# Patient Record
Sex: FEMALE | Race: OTHER | HISPANIC OR LATINO | URBAN - METROPOLITAN AREA
[De-identification: names, ages, dates, MRNs, and addresses within clinical notes are randomized per-mention and may not be internally consistent; named-entity substitution may affect disease eponyms.]

---

## 2022-07-09 ENCOUNTER — TELEPHONE ENCOUNTER (OUTPATIENT)
Dept: URBAN - METROPOLITAN AREA CLINIC 121 | Facility: CLINIC | Age: 77
End: 2022-07-09

## 2022-07-09 RX ORDER — ATORVASTATIN CALCIUM 20 MG/1
TABLET, FILM COATED ORAL
Refills: 0 | OUTPATIENT
Start: 2018-12-31 | End: 2019-07-22

## 2022-07-09 RX ORDER — ASPIRIN 81 MG/1
TABLET, FILM COATED ORAL
Refills: 0 | OUTPATIENT
Start: 2018-11-19 | End: 2018-12-31

## 2022-07-09 RX ORDER — ATORVASTATIN CALCIUM 20 MG/1
TABLET, FILM COATED ORAL
Refills: 0 | OUTPATIENT
Start: 2018-11-19 | End: 2018-12-31

## 2022-07-09 RX ORDER — ASPIRIN 81 MG/1
TABLET, FILM COATED ORAL
Refills: 0 | OUTPATIENT
Start: 2018-12-31 | End: 2019-07-22

## 2022-07-09 RX ORDER — AMLODIPINE BESYLATE 10 MG/1
TABLET ORAL
Refills: 0 | OUTPATIENT
Start: 2018-12-31 | End: 2019-07-22

## 2022-07-09 RX ORDER — AMLODIPINE BESYLATE 10 MG/1
TABLET ORAL
Refills: 0 | OUTPATIENT
Start: 2018-11-19 | End: 2018-12-31

## 2022-07-10 ENCOUNTER — TELEPHONE ENCOUNTER (OUTPATIENT)
Dept: URBAN - METROPOLITAN AREA CLINIC 121 | Facility: CLINIC | Age: 77
End: 2022-07-10

## 2022-07-10 RX ORDER — NAPROXEN SODIUM 220 MG
TAKE ONE CAPSULE BY MOUTH ONCE A DAY TABLET ORAL ONCE A DAY
Refills: 1 | Status: ACTIVE | COMMUNITY
Start: 2018-12-31

## 2022-07-10 RX ORDER — ATORVASTATIN CALCIUM 20 MG/1
TABLET, FILM COATED ORAL
Refills: 0 | Status: ACTIVE | COMMUNITY
Start: 2019-07-22

## 2022-07-10 RX ORDER — AMLODIPINE BESYLATE 10 MG/1
TABLET ORAL
Refills: 0 | Status: ACTIVE | COMMUNITY
Start: 2019-07-22

## 2022-07-10 RX ORDER — ASPIRIN 81 MG/1
TABLET, FILM COATED ORAL
Refills: 0 | Status: ACTIVE | COMMUNITY
Start: 2019-07-22

## 2022-08-01 ENCOUNTER — OFFICE VISIT (OUTPATIENT)
Dept: URBAN - METROPOLITAN AREA CLINIC 60 | Facility: CLINIC | Age: 77
End: 2022-08-01

## 2022-09-12 ENCOUNTER — OFFICE VISIT (OUTPATIENT)
Dept: URBAN - METROPOLITAN AREA CLINIC 60 | Facility: CLINIC | Age: 77
End: 2022-09-12

## 2022-10-03 ENCOUNTER — OFFICE VISIT (OUTPATIENT)
Dept: URBAN - METROPOLITAN AREA CLINIC 60 | Facility: CLINIC | Age: 77
End: 2022-10-03

## 2022-10-17 ENCOUNTER — WEB ENCOUNTER (OUTPATIENT)
Dept: URBAN - METROPOLITAN AREA CLINIC 60 | Facility: CLINIC | Age: 77
End: 2022-10-17

## 2022-10-17 ENCOUNTER — LAB OUTSIDE AN ENCOUNTER (OUTPATIENT)
Dept: URBAN - METROPOLITAN AREA CLINIC 60 | Facility: CLINIC | Age: 77
End: 2022-10-17

## 2022-10-17 ENCOUNTER — OFFICE VISIT (OUTPATIENT)
Dept: URBAN - METROPOLITAN AREA CLINIC 60 | Facility: CLINIC | Age: 77
End: 2022-10-17
Payer: COMMERCIAL

## 2022-10-17 VITALS
SYSTOLIC BLOOD PRESSURE: 128 MMHG | WEIGHT: 152.4 LBS | BODY MASS INDEX: 27 KG/M2 | TEMPERATURE: 98.7 F | HEIGHT: 63 IN | OXYGEN SATURATION: 98 % | DIASTOLIC BLOOD PRESSURE: 70 MMHG | HEART RATE: 78 BPM

## 2022-10-17 DIAGNOSIS — Z86.010 PERSONAL HISTORY OF COLONIC POLYPS: ICD-10-CM

## 2022-10-17 DIAGNOSIS — Z12.11 COLON CANCER SCREENING: ICD-10-CM

## 2022-10-17 DIAGNOSIS — K59.00 CONSTIPATION, UNSPECIFIED: ICD-10-CM

## 2022-10-17 DIAGNOSIS — R79.89 ABNORMAL LFTS: ICD-10-CM

## 2022-10-17 PROCEDURE — 99204 OFFICE O/P NEW MOD 45 MIN: CPT | Performed by: PHYSICIAN ASSISTANT

## 2022-10-17 RX ORDER — ATORVASTATIN CALCIUM 40 MG/1
AS DIRECTED TABLET, FILM COATED ORAL ONCE A DAY
Refills: 0 | Status: ACTIVE | COMMUNITY
Start: 2019-07-22

## 2022-10-17 RX ORDER — POLYETHYLENE GLYCOL-3350, SODIUM CHLORIDE, POTASSIUM CHLORIDE AND SODIUM BICARBONATE 420; 11.2; 5.72; 1.48 G/438.4G; G/438.4G; G/438.4G; G/438.4G
AS DIRECTED POWDER, FOR SOLUTION ORAL
Qty: 420 MILLILITER | Refills: 0 | OUTPATIENT
Start: 2022-10-17 | End: 2022-10-18

## 2022-10-17 RX ORDER — NAPROXEN SODIUM 220 MG
TAKE ONE CAPSULE BY MOUTH ONCE A DAY TABLET ORAL ONCE A DAY
Refills: 1 | Status: ON HOLD | COMMUNITY
Start: 2018-12-31

## 2022-10-17 RX ORDER — ASPIRIN 81 MG/1
TABLET, FILM COATED ORAL
Refills: 0 | Status: ON HOLD | COMMUNITY
Start: 2019-07-22

## 2022-10-17 RX ORDER — LISINOPRIL AND HYDROCHLOROTHIAZIDE 20; 25 MG/1; MG/1
1 TABLET TABLET ORAL ONCE A DAY
Status: ACTIVE | COMMUNITY

## 2022-10-17 RX ORDER — AMLODIPINE BESYLATE 10 MG/1
5MG TABLET ORAL
Refills: 0 | Status: ACTIVE | COMMUNITY
Start: 2019-07-22

## 2022-10-17 RX ORDER — ONDANSETRON HYDROCHLORIDE 4 MG/1
1 TABLET TABLET, FILM COATED ORAL
Qty: 2 | Refills: 0 | OUTPATIENT
Start: 2022-10-17

## 2022-10-17 NOTE — HPI-TODAY'S VISIT:
Donna is a pleasant 76 year old female who presents today for follow up.   Previous colonoscopy in 12/2018 significant for multiple tubular adenomas and placed into a 3 year recall.   Labs dated 9/7/2022 showed a normal hemoglobin.  Normal platelets.  ALT 35, total bilirubin 1.3 but otherwise normal CMP.  Thyroid panel normal.  Notes 1-2 daily bowel movements without constipation or diarrhea. Noting efficacy with Miralax daily. Denies any nausea, vomiting, dysphagia, odynophagia, hematemesis, abnormal weight loss, reflux, heartburn, abdominal pain, change in bowel habits, BRBPR or melena. Denies any family history of colon cancer or colon polyps. Notes no other GI complaints at this time.

## 2022-10-24 ENCOUNTER — LAB OUTSIDE AN ENCOUNTER (OUTPATIENT)
Dept: URBAN - METROPOLITAN AREA CLINIC 60 | Facility: CLINIC | Age: 77
End: 2022-10-24

## 2022-11-11 ENCOUNTER — CLAIMS CREATED FROM THE CLAIM WINDOW (OUTPATIENT)
Dept: URBAN - METROPOLITAN AREA CLINIC 4 | Facility: CLINIC | Age: 77
End: 2022-11-11
Payer: COMMERCIAL

## 2022-11-11 ENCOUNTER — CLAIMS CREATED FROM THE CLAIM WINDOW (OUTPATIENT)
Dept: URBAN - METROPOLITAN AREA SURGERY CENTER 4 | Facility: SURGERY CENTER | Age: 77
End: 2022-11-11
Payer: COMMERCIAL

## 2022-11-11 DIAGNOSIS — K64.0 GRADE I INTERNAL HEMORRHOIDS: ICD-10-CM

## 2022-11-11 DIAGNOSIS — Z86.010 PERSONAL HISTORY OF COLONIC POLYPS: ICD-10-CM

## 2022-11-11 DIAGNOSIS — K63.89 OTHER SPECIFIED DISEASES OF INTESTINE: ICD-10-CM

## 2022-11-11 DIAGNOSIS — D12.5 SIGMOID POLYP: ICD-10-CM

## 2022-11-11 DIAGNOSIS — D12.2 POLYP OF ASCENDING COLON: ICD-10-CM

## 2022-11-11 PROCEDURE — 88305 TISSUE EXAM BY PATHOLOGIST: CPT | Performed by: PATHOLOGY

## 2022-11-11 PROCEDURE — G8918 PT W/O PREOP ORDER IV AB PRO: HCPCS | Performed by: INTERNAL MEDICINE

## 2022-11-11 PROCEDURE — 45380 COLONOSCOPY AND BIOPSY: CPT | Performed by: INTERNAL MEDICINE

## 2022-11-11 PROCEDURE — G8907 PT DOC NO EVENTS ON DISCHARG: HCPCS | Performed by: INTERNAL MEDICINE

## 2022-11-11 RX ORDER — AMLODIPINE BESYLATE 10 MG/1
5MG TABLET ORAL
Refills: 0 | Status: ACTIVE | COMMUNITY
Start: 2019-07-22

## 2022-11-11 RX ORDER — ATORVASTATIN CALCIUM 40 MG/1
AS DIRECTED TABLET, FILM COATED ORAL ONCE A DAY
Refills: 0 | Status: ACTIVE | COMMUNITY
Start: 2019-07-22

## 2022-11-11 RX ORDER — LISINOPRIL AND HYDROCHLOROTHIAZIDE 20; 25 MG/1; MG/1
1 TABLET TABLET ORAL ONCE A DAY
Status: ACTIVE | COMMUNITY

## 2022-11-11 RX ORDER — ONDANSETRON HYDROCHLORIDE 4 MG/1
1 TABLET TABLET, FILM COATED ORAL
Qty: 2 | Refills: 0 | Status: ACTIVE | COMMUNITY
Start: 2022-10-17

## 2022-11-11 RX ORDER — NAPROXEN SODIUM 220 MG
TAKE ONE CAPSULE BY MOUTH ONCE A DAY TABLET ORAL ONCE A DAY
Refills: 1 | Status: ON HOLD | COMMUNITY
Start: 2018-12-31

## 2022-11-11 RX ORDER — ASPIRIN 81 MG/1
TABLET, FILM COATED ORAL
Refills: 0 | Status: ON HOLD | COMMUNITY
Start: 2019-07-22

## 2022-12-05 ENCOUNTER — OFFICE VISIT (OUTPATIENT)
Dept: URBAN - METROPOLITAN AREA CLINIC 60 | Facility: CLINIC | Age: 77
End: 2022-12-05

## 2023-01-18 LAB
A/G RATIO: 1.8
ALBUMIN: 4.6
ALKALINE PHOSPHATASE: 61
ALT (SGPT): 29
AST (SGOT): 32
BILIRUBIN, DIRECT: 0.18
BILIRUBIN, TOTAL: 0.8
BUN/CREATININE RATIO: 17
BUN: 14
CALCIUM: 9.7
CARBON DIOXIDE, TOTAL: 27
CHLORIDE: 100
CREATININE: 0.83
EGFR: 72
GLOBULIN, TOTAL: 2.5
GLUCOSE: 99
HBSAG SCREEN: NEGATIVE
HCV AB: 0.1
HEMATOCRIT: 37.9
HEMOGLOBIN: 12.9
HEP A AB, IGM: NEGATIVE
HEP A AB, TOTAL: POSITIVE
HEP B CORE AB, IGM: NEGATIVE
HEP B CORE AB, TOT: NEGATIVE
HEPATITIS B SURF AB QUANT: <3.1
INR: 1.1
MCH: 30.5
MCHC: 34
MCV: 90
NRBC: (no result)
PLATELETS: 221
POTASSIUM: 4.1
PROTEIN, TOTAL: 7.1
PROTHROMBIN TIME: 11
RBC: 4.23
RDW: 12.7
SODIUM: 141
WBC: 5.7

## 2023-01-23 ENCOUNTER — OFFICE VISIT (OUTPATIENT)
Dept: URBAN - METROPOLITAN AREA CLINIC 60 | Facility: CLINIC | Age: 78
End: 2023-01-23

## 2023-01-23 RX ORDER — ASPIRIN 81 MG/1
TABLET, FILM COATED ORAL
Refills: 0 | Status: ON HOLD | COMMUNITY
Start: 2019-07-22

## 2023-01-23 RX ORDER — ATORVASTATIN CALCIUM 40 MG/1
AS DIRECTED TABLET, FILM COATED ORAL ONCE A DAY
Refills: 0 | Status: ACTIVE | COMMUNITY
Start: 2019-07-22

## 2023-01-23 RX ORDER — AMLODIPINE BESYLATE 10 MG/1
5MG TABLET ORAL
Refills: 0 | Status: ACTIVE | COMMUNITY
Start: 2019-07-22

## 2023-01-23 RX ORDER — NAPROXEN SODIUM 220 MG
TAKE ONE CAPSULE BY MOUTH ONCE A DAY TABLET ORAL ONCE A DAY
Refills: 1 | Status: ON HOLD | COMMUNITY
Start: 2018-12-31

## 2023-01-23 RX ORDER — ONDANSETRON HYDROCHLORIDE 4 MG/1
1 TABLET TABLET, FILM COATED ORAL
Qty: 2 | Refills: 0 | Status: ACTIVE | COMMUNITY
Start: 2022-10-17

## 2023-01-23 RX ORDER — LISINOPRIL AND HYDROCHLOROTHIAZIDE 20; 25 MG/1; MG/1
1 TABLET TABLET ORAL ONCE A DAY
Status: ACTIVE | COMMUNITY

## 2023-02-27 ENCOUNTER — LAB OUTSIDE AN ENCOUNTER (OUTPATIENT)
Dept: URBAN - METROPOLITAN AREA CLINIC 60 | Facility: CLINIC | Age: 78
End: 2023-02-27

## 2023-02-27 ENCOUNTER — OFFICE VISIT (OUTPATIENT)
Dept: URBAN - METROPOLITAN AREA CLINIC 60 | Facility: CLINIC | Age: 78
End: 2023-02-27
Payer: COMMERCIAL

## 2023-02-27 VITALS
BODY MASS INDEX: 26.4 KG/M2 | WEIGHT: 149 LBS | HEIGHT: 63 IN | SYSTOLIC BLOOD PRESSURE: 132 MMHG | OXYGEN SATURATION: 98 % | TEMPERATURE: 98.4 F | DIASTOLIC BLOOD PRESSURE: 70 MMHG | HEART RATE: 78 BPM

## 2023-02-27 DIAGNOSIS — Z12.11 COLON CANCER SCREENING: ICD-10-CM

## 2023-02-27 DIAGNOSIS — R79.89 ABNORMAL LFTS: ICD-10-CM

## 2023-02-27 DIAGNOSIS — Z86.010 PERSONAL HISTORY OF COLONIC POLYPS: ICD-10-CM

## 2023-02-27 DIAGNOSIS — K76.0 FATTY LIVER: ICD-10-CM

## 2023-02-27 DIAGNOSIS — K59.00 CONSTIPATION, UNSPECIFIED: ICD-10-CM

## 2023-02-27 PROBLEM — 197321007: Status: ACTIVE | Noted: 2023-02-27

## 2023-02-27 PROCEDURE — 99213 OFFICE O/P EST LOW 20 MIN: CPT | Performed by: PHYSICIAN ASSISTANT

## 2023-02-27 RX ORDER — ATORVASTATIN CALCIUM 40 MG/1
AS DIRECTED TABLET, FILM COATED ORAL ONCE A DAY
Refills: 0 | Status: ACTIVE | COMMUNITY
Start: 2019-07-22

## 2023-02-27 RX ORDER — LISINOPRIL AND HYDROCHLOROTHIAZIDE 20; 25 MG/1; MG/1
1 TABLET TABLET ORAL ONCE A DAY
Status: ACTIVE | COMMUNITY

## 2023-02-27 RX ORDER — LORATADINE 10 MG
1 PACKET MIXED WITH 8 OUNCES OF FLUID TABLET,DISINTEGRATING ORAL ONCE A DAY
Status: ACTIVE | COMMUNITY

## 2023-02-27 RX ORDER — METOPROLOL TARTRATE 25 MG/1
AS DIRECTED TABLET, FILM COATED ORAL TWICE A DAY
Status: ACTIVE | COMMUNITY
Start: 2022-10-17

## 2023-02-27 RX ORDER — AMLODIPINE BESYLATE 5 MG/1
5MG TABLET ORAL
Refills: 0 | Status: ACTIVE | COMMUNITY
Start: 2019-07-22

## 2023-02-27 NOTE — HPI-TODAY'S VISIT:
Donna is a pleasant 77 year old female who presents today for a procedure follow up.   Labs dated 1/16/2023 showed direct bilirubin normal.  No immunity to hep B.  Normal CMP.  Normal CBC.  PT/INR normal.  Hep A antibody total reactive but IgM negative.  Remaining hepatitis panel negative.  Colonoscopy dated 11/11/2022 demonstrated fair preparation of the colon.  1 diminutive benign polyp removed from the proximal ascending colon.  1 diminutive benign polyp removed from the mid sigmoid colon.  Nonbleeding internal hemorrhoids.  Repeat colonoscopy as needed per protocol  Ultrasound dated 11/21/2022 showed no acute findings.  No significant hepatic pathology  Labs dated 2/7/2023 showed a normal renal function.  Normal LFTs.  Normal T4.  Normal TSH.  Normal T3.  No issues after procedure. Notes 1-2 daily bowel movements without constipation or diarrhea. Noting efficacy with Miralax daily. Denies any nausea, vomiting, reflux, heartburn, dysphagia, odynophagia, hematemesis,  coffee ground emesis, abdominal pain, change in bowel habits, abnormal weight loss, BRBPR or melena. Denies any family history of colon cancer or colon polyps. Notes no other GI complaints at this time.

## 2023-04-10 ENCOUNTER — OFFICE VISIT (OUTPATIENT)
Dept: URBAN - METROPOLITAN AREA CLINIC 60 | Facility: CLINIC | Age: 78
End: 2023-04-10

## 2023-06-05 ENCOUNTER — OFFICE VISIT (OUTPATIENT)
Dept: URBAN - METROPOLITAN AREA CLINIC 60 | Facility: CLINIC | Age: 78
End: 2023-06-05

## 2023-09-18 ENCOUNTER — OFFICE VISIT (OUTPATIENT)
Dept: URBAN - METROPOLITAN AREA CLINIC 60 | Facility: CLINIC | Age: 78
End: 2023-09-18
Payer: COMMERCIAL

## 2023-09-18 ENCOUNTER — DASHBOARD ENCOUNTERS (OUTPATIENT)
Age: 78
End: 2023-09-18

## 2023-09-18 VITALS
DIASTOLIC BLOOD PRESSURE: 80 MMHG | SYSTOLIC BLOOD PRESSURE: 138 MMHG | OXYGEN SATURATION: 98 % | WEIGHT: 153 LBS | HEIGHT: 63 IN | BODY MASS INDEX: 27.11 KG/M2 | RESPIRATION RATE: 20 BRPM | TEMPERATURE: 97.8 F | HEART RATE: 77 BPM

## 2023-09-18 DIAGNOSIS — K59.00 CONSTIPATION, UNSPECIFIED: ICD-10-CM

## 2023-09-18 DIAGNOSIS — Z86.010 PERSONAL HISTORY OF COLONIC POLYPS: ICD-10-CM

## 2023-09-18 DIAGNOSIS — K76.0 FATTY LIVER: ICD-10-CM

## 2023-09-18 PROCEDURE — 99213 OFFICE O/P EST LOW 20 MIN: CPT | Performed by: PHYSICIAN ASSISTANT

## 2023-09-18 RX ORDER — AMLODIPINE BESYLATE 5 MG/1
5MG TABLET ORAL
Refills: 0 | Status: ACTIVE | COMMUNITY
Start: 2019-07-22

## 2023-09-18 RX ORDER — ATORVASTATIN CALCIUM 40 MG/1
AS DIRECTED TABLET, FILM COATED ORAL ONCE A DAY
Refills: 0 | Status: ACTIVE | COMMUNITY
Start: 2019-07-22

## 2023-09-18 RX ORDER — LORATADINE 10 MG
1 PACKET MIXED WITH 8 OUNCES OF FLUID TABLET,DISINTEGRATING ORAL ONCE A DAY
Status: ACTIVE | COMMUNITY

## 2023-09-18 RX ORDER — ERGOCALCIFEROL CAPSULES, 1.25 MG/1
1 CAPSULE CAPSULE ORAL
Status: ACTIVE | COMMUNITY

## 2023-09-18 RX ORDER — LISINOPRIL AND HYDROCHLOROTHIAZIDE 20; 25 MG/1; MG/1
1 TABLET TABLET ORAL ONCE A DAY
Status: ACTIVE | COMMUNITY

## 2023-09-18 RX ORDER — METOPROLOL TARTRATE 25 MG/1
AS DIRECTED TABLET, FILM COATED ORAL TWICE A DAY
Status: ACTIVE | COMMUNITY
Start: 2022-10-17

## 2023-09-18 NOTE — HPI-TODAY'S VISIT:
Donna is a pleasant 77 year old female who presents today for a follow up.   FibroScan dated 9/7/2023 showed S0 and F0  Labs dated 8/7/2023 showed normal renal function.  Normal LFTs.  T4 measuring 1.1 (H).  Normal TSH.  Normal T3.  Labs dated 1/16/2023 showed direct bilirubin normal.  No immunity to hep B.  Normal CMP.  Normal CBC.  PT/INR normal.  Hep A antibody total reactive but IgM negative.  Remaining hepatitis panel negative.  Colonoscopy dated 11/11/2022 demonstrated fair preparation of the colon.  1 diminutive benign polyp removed from the proximal ascending colon.  1 diminutive benign polyp removed from the mid sigmoid colon.  Nonbleeding internal hemorrhoids.  Repeat colonoscopy as needed per protocol  Ultrasound dated 11/21/2022 showed no acute findings.  No significant hepatic pathology  Notes 1-2 daily bowel movements without constipation or diarrhea. Noting efficacy with Miralax daily. Denies any nausea, vomiting, reflux, heartburn, dysphagia, odynophagia, hematemesis,  coffee ground emesis, abdominal pain, change in bowel habits, abnormal weight loss, BRBPR or melena. Denies any family history of colon cancer or colon polyps. Notes no other GI complaints at this time.